# Patient Record
Sex: FEMALE | Race: BLACK OR AFRICAN AMERICAN | NOT HISPANIC OR LATINO
[De-identification: names, ages, dates, MRNs, and addresses within clinical notes are randomized per-mention and may not be internally consistent; named-entity substitution may affect disease eponyms.]

---

## 2018-04-06 ENCOUNTER — OTHER (OUTPATIENT)
Age: 63
End: 2018-04-06

## 2018-04-24 ENCOUNTER — OTHER (OUTPATIENT)
Age: 63
End: 2018-04-24

## 2019-07-26 ENCOUNTER — INPATIENT (INPATIENT)
Facility: HOSPITAL | Age: 64
LOS: 1 days | Discharge: HOME | End: 2019-07-28
Attending: HOSPITALIST | Admitting: HOSPITALIST
Payer: COMMERCIAL

## 2019-07-26 VITALS
SYSTOLIC BLOOD PRESSURE: 143 MMHG | OXYGEN SATURATION: 99 % | TEMPERATURE: 98 F | RESPIRATION RATE: 18 BRPM | DIASTOLIC BLOOD PRESSURE: 90 MMHG | HEART RATE: 99 BPM

## 2019-07-26 DIAGNOSIS — K85.90 ACUTE PANCREATITIS WITHOUT NECROSIS OR INFECTION, UNSPECIFIED: ICD-10-CM

## 2019-07-26 DIAGNOSIS — Z29.9 ENCOUNTER FOR PROPHYLACTIC MEASURES, UNSPECIFIED: ICD-10-CM

## 2019-07-26 DIAGNOSIS — J44.9 CHRONIC OBSTRUCTIVE PULMONARY DISEASE, UNSPECIFIED: ICD-10-CM

## 2019-07-26 DIAGNOSIS — I67.1 CEREBRAL ANEURYSM, NONRUPTURED: ICD-10-CM

## 2019-07-26 DIAGNOSIS — I10 ESSENTIAL (PRIMARY) HYPERTENSION: ICD-10-CM

## 2019-07-26 DIAGNOSIS — G47.30 SLEEP APNEA, UNSPECIFIED: ICD-10-CM

## 2019-07-26 DIAGNOSIS — K21.9 GASTRO-ESOPHAGEAL REFLUX DISEASE WITHOUT ESOPHAGITIS: ICD-10-CM

## 2019-07-26 LAB
ALBUMIN SERPL ELPH-MCNC: 4 G/DL — SIGNIFICANT CHANGE UP (ref 3.3–5)
ALP SERPL-CCNC: 186 U/L — HIGH (ref 40–120)
ALT FLD-CCNC: 26 U/L — SIGNIFICANT CHANGE UP (ref 4–33)
ANION GAP SERPL CALC-SCNC: 11 MMO/L — SIGNIFICANT CHANGE UP (ref 7–14)
APTT BLD: 35.1 SEC — SIGNIFICANT CHANGE UP (ref 27.5–36.3)
AST SERPL-CCNC: 23 U/L — SIGNIFICANT CHANGE UP (ref 4–32)
BASOPHILS # BLD AUTO: 0.02 K/UL — SIGNIFICANT CHANGE UP (ref 0–0.2)
BASOPHILS NFR BLD AUTO: 0.2 % — SIGNIFICANT CHANGE UP (ref 0–2)
BILIRUB SERPL-MCNC: 0.4 MG/DL — SIGNIFICANT CHANGE UP (ref 0.2–1.2)
BLD GP AB SCN SERPL QL: NEGATIVE — SIGNIFICANT CHANGE UP
BUN SERPL-MCNC: 20 MG/DL — SIGNIFICANT CHANGE UP (ref 7–23)
CALCIUM SERPL-MCNC: 9.5 MG/DL — SIGNIFICANT CHANGE UP (ref 8.4–10.5)
CHLORIDE SERPL-SCNC: 102 MMOL/L — SIGNIFICANT CHANGE UP (ref 98–107)
CO2 SERPL-SCNC: 25 MMOL/L — SIGNIFICANT CHANGE UP (ref 22–31)
CREAT SERPL-MCNC: 1.01 MG/DL — SIGNIFICANT CHANGE UP (ref 0.5–1.3)
EOSINOPHIL # BLD AUTO: 0.14 K/UL — SIGNIFICANT CHANGE UP (ref 0–0.5)
EOSINOPHIL NFR BLD AUTO: 1.4 % — SIGNIFICANT CHANGE UP (ref 0–6)
GLUCOSE SERPL-MCNC: 100 MG/DL — HIGH (ref 70–99)
HCT VFR BLD CALC: 41.1 % — SIGNIFICANT CHANGE UP (ref 34.5–45)
HGB BLD-MCNC: 13.4 G/DL — SIGNIFICANT CHANGE UP (ref 11.5–15.5)
IMM GRANULOCYTES NFR BLD AUTO: 0.2 % — SIGNIFICANT CHANGE UP (ref 0–1.5)
INR BLD: 1.2 — HIGH (ref 0.88–1.17)
LIDOCAIN IGE QN: > 600 U/L — HIGH (ref 7–60)
LYMPHOCYTES # BLD AUTO: 2.02 K/UL — SIGNIFICANT CHANGE UP (ref 1–3.3)
LYMPHOCYTES # BLD AUTO: 20.3 % — SIGNIFICANT CHANGE UP (ref 13–44)
MCHC RBC-ENTMCNC: 30.6 PG — SIGNIFICANT CHANGE UP (ref 27–34)
MCHC RBC-ENTMCNC: 32.6 % — SIGNIFICANT CHANGE UP (ref 32–36)
MCV RBC AUTO: 93.8 FL — SIGNIFICANT CHANGE UP (ref 80–100)
MONOCYTES # BLD AUTO: 0.69 K/UL — SIGNIFICANT CHANGE UP (ref 0–0.9)
MONOCYTES NFR BLD AUTO: 6.9 % — SIGNIFICANT CHANGE UP (ref 2–14)
NEUTROPHILS # BLD AUTO: 7.05 K/UL — SIGNIFICANT CHANGE UP (ref 1.8–7.4)
NEUTROPHILS NFR BLD AUTO: 71 % — SIGNIFICANT CHANGE UP (ref 43–77)
NRBC # FLD: 0 K/UL — SIGNIFICANT CHANGE UP (ref 0–0)
PLATELET # BLD AUTO: 325 K/UL — SIGNIFICANT CHANGE UP (ref 150–400)
PMV BLD: 9.5 FL — SIGNIFICANT CHANGE UP (ref 7–13)
POTASSIUM SERPL-MCNC: 4.8 MMOL/L — SIGNIFICANT CHANGE UP (ref 3.5–5.3)
POTASSIUM SERPL-SCNC: 4.8 MMOL/L — SIGNIFICANT CHANGE UP (ref 3.5–5.3)
PROT SERPL-MCNC: 8.3 G/DL — SIGNIFICANT CHANGE UP (ref 6–8.3)
PROTHROM AB SERPL-ACNC: 13.8 SEC — HIGH (ref 9.8–13.1)
RBC # BLD: 4.38 M/UL — SIGNIFICANT CHANGE UP (ref 3.8–5.2)
RBC # FLD: 14.2 % — SIGNIFICANT CHANGE UP (ref 10.3–14.5)
RH IG SCN BLD-IMP: POSITIVE — SIGNIFICANT CHANGE UP
SODIUM SERPL-SCNC: 138 MMOL/L — SIGNIFICANT CHANGE UP (ref 135–145)
TRIGL SERPL-MCNC: 126 MG/DL — SIGNIFICANT CHANGE UP (ref 10–149)
WBC # BLD: 9.94 K/UL — SIGNIFICANT CHANGE UP (ref 3.8–10.5)
WBC # FLD AUTO: 9.94 K/UL — SIGNIFICANT CHANGE UP (ref 3.8–10.5)

## 2019-07-26 PROCEDURE — 76705 ECHO EXAM OF ABDOMEN: CPT | Mod: 26

## 2019-07-26 PROCEDURE — 71045 X-RAY EXAM CHEST 1 VIEW: CPT | Mod: 26

## 2019-07-26 PROCEDURE — 99223 1ST HOSP IP/OBS HIGH 75: CPT | Mod: GC

## 2019-07-26 RX ORDER — MORPHINE SULFATE 50 MG/1
4 CAPSULE, EXTENDED RELEASE ORAL ONCE
Refills: 0 | Status: DISCONTINUED | OUTPATIENT
Start: 2019-07-26 | End: 2019-07-26

## 2019-07-26 RX ORDER — IPRATROPIUM/ALBUTEROL SULFATE 18-103MCG
3 AEROSOL WITH ADAPTER (GRAM) INHALATION EVERY 6 HOURS
Refills: 0 | Status: DISCONTINUED | OUTPATIENT
Start: 2019-07-26 | End: 2019-07-28

## 2019-07-26 RX ORDER — CLOPIDOGREL BISULFATE 75 MG/1
1 TABLET, FILM COATED ORAL
Qty: 0 | Refills: 0 | DISCHARGE

## 2019-07-26 RX ORDER — SODIUM CHLORIDE 9 MG/ML
1000 INJECTION INTRAMUSCULAR; INTRAVENOUS; SUBCUTANEOUS
Refills: 0 | Status: DISCONTINUED | OUTPATIENT
Start: 2019-07-26 | End: 2019-07-26

## 2019-07-26 RX ORDER — CLOPIDOGREL BISULFATE 75 MG/1
75 TABLET, FILM COATED ORAL DAILY
Refills: 0 | Status: DISCONTINUED | OUTPATIENT
Start: 2019-07-26 | End: 2019-07-28

## 2019-07-26 RX ORDER — SODIUM CHLORIDE 9 MG/ML
1000 INJECTION, SOLUTION INTRAVENOUS
Refills: 0 | Status: DISCONTINUED | OUTPATIENT
Start: 2019-07-26 | End: 2019-07-27

## 2019-07-26 RX ORDER — SODIUM CHLORIDE 9 MG/ML
1000 INJECTION INTRAMUSCULAR; INTRAVENOUS; SUBCUTANEOUS ONCE
Refills: 0 | Status: DISCONTINUED | OUTPATIENT
Start: 2019-07-26 | End: 2019-07-26

## 2019-07-26 RX ORDER — ONDANSETRON 8 MG/1
4 TABLET, FILM COATED ORAL EVERY 8 HOURS
Refills: 0 | Status: DISCONTINUED | OUTPATIENT
Start: 2019-07-26 | End: 2019-07-28

## 2019-07-26 RX ORDER — MORPHINE SULFATE 50 MG/1
4 CAPSULE, EXTENDED RELEASE ORAL EVERY 6 HOURS
Refills: 0 | Status: DISCONTINUED | OUTPATIENT
Start: 2019-07-26 | End: 2019-07-26

## 2019-07-26 RX ORDER — SODIUM CHLORIDE 9 MG/ML
1000 INJECTION INTRAMUSCULAR; INTRAVENOUS; SUBCUTANEOUS ONCE
Refills: 0 | Status: COMPLETED | OUTPATIENT
Start: 2019-07-26 | End: 2019-07-26

## 2019-07-26 RX ORDER — IPRATROPIUM/ALBUTEROL SULFATE 18-103MCG
3 AEROSOL WITH ADAPTER (GRAM) INHALATION ONCE
Refills: 0 | Status: COMPLETED | OUTPATIENT
Start: 2019-07-26 | End: 2019-07-26

## 2019-07-26 RX ORDER — BUDESONIDE AND FORMOTEROL FUMARATE DIHYDRATE 160; 4.5 UG/1; UG/1
2 AEROSOL RESPIRATORY (INHALATION)
Refills: 0 | Status: DISCONTINUED | OUTPATIENT
Start: 2019-07-26 | End: 2019-07-28

## 2019-07-26 RX ORDER — LINACLOTIDE 145 UG/1
1 CAPSULE, GELATIN COATED ORAL
Qty: 0 | Refills: 0 | DISCHARGE

## 2019-07-26 RX ORDER — MORPHINE SULFATE 50 MG/1
2 CAPSULE, EXTENDED RELEASE ORAL EVERY 6 HOURS
Refills: 0 | Status: DISCONTINUED | OUTPATIENT
Start: 2019-07-26 | End: 2019-07-27

## 2019-07-26 RX ORDER — MORPHINE SULFATE 50 MG/1
4 CAPSULE, EXTENDED RELEASE ORAL EVERY 6 HOURS
Refills: 0 | Status: DISCONTINUED | OUTPATIENT
Start: 2019-07-26 | End: 2019-07-27

## 2019-07-26 RX ORDER — ONDANSETRON 8 MG/1
4 TABLET, FILM COATED ORAL DAILY
Refills: 0 | Status: DISCONTINUED | OUTPATIENT
Start: 2019-07-26 | End: 2019-07-26

## 2019-07-26 RX ORDER — PANTOPRAZOLE SODIUM 20 MG/1
40 TABLET, DELAYED RELEASE ORAL
Refills: 0 | Status: DISCONTINUED | OUTPATIENT
Start: 2019-07-26 | End: 2019-07-28

## 2019-07-26 RX ORDER — AMLODIPINE BESYLATE 2.5 MG/1
2.5 TABLET ORAL EVERY 24 HOURS
Refills: 0 | Status: DISCONTINUED | OUTPATIENT
Start: 2019-07-26 | End: 2019-07-28

## 2019-07-26 RX ORDER — PANTOPRAZOLE SODIUM 20 MG/1
1 TABLET, DELAYED RELEASE ORAL
Qty: 0 | Refills: 0 | DISCHARGE

## 2019-07-26 RX ORDER — ONDANSETRON 8 MG/1
4 TABLET, FILM COATED ORAL ONCE
Refills: 0 | Status: COMPLETED | OUTPATIENT
Start: 2019-07-26 | End: 2019-07-26

## 2019-07-26 RX ADMIN — Medication 3 MILLILITER(S): at 21:17

## 2019-07-26 RX ADMIN — SODIUM CHLORIDE 150 MILLILITER(S): 9 INJECTION, SOLUTION INTRAVENOUS at 15:33

## 2019-07-26 RX ADMIN — Medication 3 MILLILITER(S): at 04:14

## 2019-07-26 RX ADMIN — AMLODIPINE BESYLATE 2.5 MILLIGRAM(S): 2.5 TABLET ORAL at 19:11

## 2019-07-26 RX ADMIN — CLOPIDOGREL BISULFATE 75 MILLIGRAM(S): 75 TABLET, FILM COATED ORAL at 19:11

## 2019-07-26 RX ADMIN — Medication 3 MILLILITER(S): at 04:26

## 2019-07-26 RX ADMIN — MORPHINE SULFATE 4 MILLIGRAM(S): 50 CAPSULE, EXTENDED RELEASE ORAL at 03:17

## 2019-07-26 RX ADMIN — MORPHINE SULFATE 4 MILLIGRAM(S): 50 CAPSULE, EXTENDED RELEASE ORAL at 19:10

## 2019-07-26 RX ADMIN — ONDANSETRON 4 MILLIGRAM(S): 8 TABLET, FILM COATED ORAL at 03:17

## 2019-07-26 RX ADMIN — BUDESONIDE AND FORMOTEROL FUMARATE DIHYDRATE 2 PUFF(S): 160; 4.5 AEROSOL RESPIRATORY (INHALATION) at 22:10

## 2019-07-26 RX ADMIN — Medication 3 MILLILITER(S): at 03:27

## 2019-07-26 RX ADMIN — SODIUM CHLORIDE 100 MILLILITER(S): 9 INJECTION INTRAMUSCULAR; INTRAVENOUS; SUBCUTANEOUS at 11:24

## 2019-07-26 RX ADMIN — MORPHINE SULFATE 4 MILLIGRAM(S): 50 CAPSULE, EXTENDED RELEASE ORAL at 10:50

## 2019-07-26 RX ADMIN — MORPHINE SULFATE 4 MILLIGRAM(S): 50 CAPSULE, EXTENDED RELEASE ORAL at 09:17

## 2019-07-26 RX ADMIN — Medication 3 MILLILITER(S): at 17:27

## 2019-07-26 RX ADMIN — MORPHINE SULFATE 4 MILLIGRAM(S): 50 CAPSULE, EXTENDED RELEASE ORAL at 17:24

## 2019-07-26 RX ADMIN — SODIUM CHLORIDE 500 MILLILITER(S): 9 INJECTION INTRAMUSCULAR; INTRAVENOUS; SUBCUTANEOUS at 03:17

## 2019-07-26 NOTE — ED ADULT NURSE NOTE - NSIMPLEMENTINTERV_GEN_ALL_ED
Implemented All Universal Safety Interventions:  [place] to call system. Call bell, personal items and telephone within reach. Instruct patient to call for assistance. Room bathroom lighting operational. Non-slip footwear when patient is off stretcher. Physically safe environment: no spills, clutter or unnecessary equipment. Stretcher in lowest position, wheels locked, appropriate side rails in place.

## 2019-07-26 NOTE — ED ADULT NURSE NOTE - OBJECTIVE STATEMENT
received pt sitting in stretcher reports epigastric pain with nausea since sunday.  pt reports pain is worse with eating, has had a few episodes of diarrhea.  IV #22 placed in left ac, unable to obtain larger access, md aware.  medicated as ordered/

## 2019-07-26 NOTE — ED PROVIDER NOTE - OBJECTIVE STATEMENT
Gurmeet STEINBERG MD PGY2: 63 F PMH CKD Gurmeet STEINBERG MD PGY2: 63 F PMH CKD and diabetes here for persistent RUQ and epigastric abdominal pain since Sunday associated with two episodes of NBNB emesis earlier during the day yesterday. No fevers, chills. Has no history of cholelithiasis. History of C section in the past. Passing flatus. No known trigger.

## 2019-07-26 NOTE — H&P ADULT - PROBLEM SELECTOR PLAN 4
- right sided s/p coil placement in 2001  - left sided without intervention   - on Plavix 75 mg at home supposedly for coil

## 2019-07-26 NOTE — H&P ADULT - ASSESSMENT
62 yo female with a PMHx of HTN, COPD/Asthma, MACK, GERD, bl brain aneurisms (s/p coiling of the right) , newly diagnosed Von Myenberg syndrome in May 2019, and IBS presenting for 4 days of epigastric pain that radiates to the RUQ found to have elevated lipase >600 and a nl RUQ scan suggestive of acute pancreatitis

## 2019-07-26 NOTE — H&P ADULT - ATTENDING COMMENTS
Patient was seen and examined personally by me. I have discussed the plan and reviewed the resident's note and agree with the above physical exam findings including assessment and plan except as indicated below.    Start clear liquids, advance diet as tolerated, IVF for now  morphine PRN for pain control  Check IGG4 Ab  Hold off Edarbyclor for BP control. Will do norvasc instead for now Patient was seen and examined personally by me. I have discussed the plan and reviewed the resident's note and agree with the above physical exam findings including assessment and plan except as indicated below.    Start clear liquids, advance diet as tolerated, IVF for now  morphine PRN for pain control  Check IGG4 Ab  Hold off Edarbyclor for BP control. Will do norvasc instead for now  Obtain CXR

## 2019-07-26 NOTE — ED PROVIDER NOTE - PHYSICAL EXAMINATION
Gurmeet STEINBERG MD PGY2:   PHYSICAL EXAM:    GENERAL: Uncomfortable  HEENT:  Atraumatic, Normocephalic  CHEST/LUNG: Chest rise equal bilaterally  HEART: Regular rate and rhythm  ABDOMEN: TTP RUQ and epigastrium.   EXTREMITIES:  2+ Peripheral Pulses.  PSYCH: A&Ox3  SKIN: No obvious rashes or lesions Gurmeet STEINBERG MD PGY2:   PHYSICAL EXAM:    GENERAL: Uncomfortable  HEENT:  Atraumatic, Normocephalic  CHEST/LUNG: Chest rise equal bilaterally  HEART: Regular rate and rhythm  ABDOMEN: TTP RUQ and epigastrium. No rebound, no guarding, no CVAT.,   EXTREMITIES:  2+ Peripheral Pulses.  PSYCH: A&Ox3  SKIN: No obvious rashes or lesions

## 2019-07-26 NOTE — H&P ADULT - NSHPREVIEWOFSYSTEMS_GEN_ALL_CORE
REVIEW OF SYSTEMS:    CONSTITUTIONAL: +weakness, no fevers, +chills  EYES/ENT: No visual changes;  No vertigo or throat pain   NECK: No pain or stiffness  RESPIRATORY: + nonproductive cough from COPD, no wheezing, hemoptysis; +sob in the ED   CARDIOVASCULAR: No chest pain or palpitations  GASTROINTESTINAL:  epigastric pain radiating to the RUQ; +nausea, vomiting, or hematemesis; No diarrhea or constipation. No melena or hematochezia.  GENITOURINARY: No dysuria, frequency or hematuria  NEUROLOGICAL: No numbness or weakness of mm   SKIN: No itching, burning, rashes, or lesions   All other review of systems is negative unless indicated above.

## 2019-07-26 NOTE — ED PROVIDER NOTE - PSH
Cerebral Aneurysm Coiling    Coil Insertion    Essential hypertension, benign    GERD (Gastroesophageal Reflux Disease)    S/P  Section  x 2  Uterine Embolization  2002

## 2019-07-26 NOTE — H&P ADULT - NSHPPHYSICALEXAM_GEN_ALL_CORE
PHYSICAL EXAM:    Vital Signs Last 24 Hrs  T(C): 36.6 (26 Jul 2019 09:10), Max: 36.7 (26 Jul 2019 00:31)  T(F): 97.9 (26 Jul 2019 09:10), Max: 98.1 (26 Jul 2019 00:31)  HR: 83 (26 Jul 2019 09:10) (83 - 109)  BP: 130/59 (26 Jul 2019 09:10) (114/68 - 143/90)  BP(mean): --  RR: 18 (26 Jul 2019 09:10) (17 - 18)  SpO2: 98% (26 Jul 2019 09:10) (97% - 99%)    General: No acute distress.  HEENT:  PERRL.  EOMI.  No scleral icterus or injection.  Moist MM.  No oropharyngeal exudates.    Neck: Supple.  Full ROM.  No JVD.  No thyromegaly. No lymphadenopathy.   Heart: RRR.  Normal S1 and S2.  No murmurs, rubs, or gallops.   Lungs: bibasilar crackles, wheezing present bl   Abdomen: BS+, soft, NT/ND, normoactive, +guarding without rigidity, diffusely TTP, organomegaly could not be assessed   Skin: Warm and dry.  No rashes.  Extremities: No edema, clubbing, or cyanosis   Musculoskeletal: No obvious deformities  Neuro: A&Ox3, moving all extremities   Psych: calm, cooperative

## 2019-07-26 NOTE — ED PROVIDER NOTE - PROGRESS NOTE DETAILS
Gurmeet STEINBERG MD PGY2: Patient's lipase elevated with normal US. Will obtain triglycerides and admit to medicine.

## 2019-07-26 NOTE — H&P ADULT - PROBLEM SELECTOR PLAN 7
DVT: mobility impaired due to pain, will start heparin 5000 Units subq  Diet: will start on clears; advance as tolerated   Dispo: pending PT eval, most likely home

## 2019-07-26 NOTE — H&P ADULT - PROBLEM SELECTOR PLAN 1
- Patient with worsening epigastric pain radiating to RUQ found to have lipase >600 suggestive of acute pancreatitis; etiology currently unknown  - ddx include hypercalcemia, hypertriglyceridemia, alcohol abuse, autoimmune pancreatitis, cholelithiasis and abdominal trauma   - denies abdominal trauma, hx of abdominal trauma, Ca and TG wnl, no hx of alcohol abuse with last drink in May, no hx of autoimmune dz in patient or family, no sign of cholelithiasis on RUQ US, no recent ERCP  - consider drug induced: no opioid hx but is on Edarbyclor for HTN which has a Sartan component, as per uptodate, sartans are class I drugs that can cause pancreatitis  - hold home antihypertensive  - switch NS to  cc/hr, evidence that patients with pancreatitis fair better on LR vs. NS   - bibasilar crackles present on exam; f/u CXR and monitor fluid status   - patient denies current nausea; d/c NPO  - start on clears; advance diet as tolerated   - continue pain control: morphine 4mg q6 PRN pain; 2mg q6 PRN breakthrough pain  - zofran 4mg IV q8 PRN nausea   - Alk phos elevated, f/u GGT - Patient with worsening epigastric pain radiating to RUQ found to have lipase >600 suggestive of acute pancreatitis; etiology currently unknown  - ddx include hypercalcemia, hypertriglyceridemia, alcohol abuse, autoimmune pancreatitis, cholelithiasis and abdominal trauma   - denies abdominal trauma, hx of abdominal trauma, Ca and TG wnl, no hx of alcohol abuse with last drink in May, no hx of autoimmune dz in patient or family, no sign of cholelithiasis on RUQ US, no recent ERCP  - consider drug induced: no opioid hx but is on Edarbyclor for HTN which has a Sartan component, as per uptodate, sartans are class I drugs that can cause pancreatitis  - hold home antihypertensive  - switch NS to  cc/hr, evidence that patients with pancreatitis fair better on LR vs. NS   - bibasilar crackles present on exam; f/u CXR and monitor fluid status   - patient denies current nausea; d/c NPO  - start on clears; advance diet as tolerated   - continue pain control: morphine 4mg q6 PRN pain; 2mg q6 PRN breakthrough pain  - zofran 4mg IV q8 PRN nausea   - Alk phos elevated, f/u GGT  - r/o autoimmune pacreatitis; f/u IGG4 Ab

## 2019-07-26 NOTE — ED PROVIDER NOTE - ATTENDING CONTRIBUTION TO CARE
HPI: 62 yo F with Past Medical History of CKD, and DM here for persistent RUQ and epigastric abdominal pain since monday associated with two episodes of NBNB emesis earlier during the day yesterday. No fevers, chills, chest pain, SOB, weakness, falls. Decreased appetitie. Has no history of cholelithiasis. History of C section in the past. Passing flatus. No known trigger.  EXAM: tenderness to palpation epigastrium and RUQ + murphys. otherwise no rebound, no guarding. No grey scherer or cullens sign.   MDM: concern for pancreatitis vs cholecystitis. Will obtain labs, imaging, provide IVF and pain meds, Make NPO in case pt needs emergency surgery. Will monitor and reassess.

## 2019-07-26 NOTE — ED PROVIDER NOTE - PMH
Aneurysm    Anxiety    Asthma    Asthma with Chronic Obstructive Pulmonary Disease (COPD)    Cerebral Aneurysm  2 small aneurysm's found 2/2011, s/p coiling in 2001  GERD (Gastroesophageal Reflux Disease)    Hypertension  since 2001  Migraines    Obesity    Obstructive Sleep Apnea    Sleep Apnea  Dx June 2010- uses CPAP machine  TIA (Transient Ischemic Attack)  2001, Feb 2011  TIA (Transient Ischemic Attack)    Uterine Leiomyoma

## 2019-07-26 NOTE — H&P ADULT - PROBLEM SELECTOR PLAN 3
- patient on Trelegy ellipta at home  - prior on Symbicort but was not affective   - slight wheezing on exam today   - start duonebs q6 PRN wheezing  - start Symbicort 160 for COPD, Trelegy equivalent not available as per pharmacy

## 2019-07-26 NOTE — H&P ADULT - PROBLEM SELECTOR PLAN 2
- on Edarbyclor 40 mg at home  - will hold since sartan component may cause pancreatitis   - will start 2.5 mg amlodipine q24   - continue to monitor BPs as patient on LR

## 2019-07-26 NOTE — ED ADULT NURSE REASSESSMENT NOTE - NS ED NURSE REASSESS COMMENT FT1
Patient asleep, appears comfortable and in no apparent distress. Awaiting for admission.  Daughter at bedside.  Vitals taken and will continue to monitor patient.

## 2019-07-26 NOTE — ED ADULT TRIAGE NOTE - CHIEF COMPLAINT QUOTE
abdominal pain since Sunday and is worse two days ago. Pt was seen by GI MD and PMD and was told to come to ER if the pain is not going away. vomiting x 2 today. No diarrhea. PMH of IBS

## 2019-07-26 NOTE — H&P ADULT - NSHPSOCIALHISTORY_GEN_ALL_CORE
30 years of smoking hx, currently active smoker, 7 cigarettes a day   Occasional alcohol use, last drink in May   Denies cocaine, heroine, opioid, marijuana use    Lives at home with  and son

## 2019-07-26 NOTE — ED PROVIDER NOTE - CLINICAL SUMMARY MEDICAL DECISION MAKING FREE TEXT BOX
Gurmeet STEINBERG MD PGY2: Patient here with RUQ TTP and difficulty tolerating PO. Will obtain RUQ US and basic labs with lipase.

## 2019-07-26 NOTE — H&P ADULT - HISTORY OF PRESENT ILLNESS
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 The pain is a sharp severe pain not associated with eating. Patient has not had this type of pain before. She has taken 2 doses of Tylenol each night for the pain but the pain has progressively worsened within the last 4 days prompting her ED visit.  Denies fevers but endorses chills x 2 days along with fatigue. Denies cp, SOB, and cough. Endorses nausea and postprandial NBNB emesis x 4 episodes, 1 prior to presentation. Endorses diarrhea within the last 4 days that is chronic due to her IBS. Endorses frequency and polyuria (no hx of DM). Denies hx of alcohol abuse and is a social drinker only (last drink in May), denies any abdominal trauma, denies hx of autoimmune disease in her or in family members, denies hx of cholelithiasis or prior pancreatitis, denies hx of opioid use, denies any recent ERCPS. Was seen by her GI doctor Dr. Mahajan and was told her sx may be due to an ulcer and endoscopy was recommended at that time. Patient also was seen by Dr. Mccabe, her PMD and was prescribed Zofran and "xanax" for her "ulcer."    ED vitals: Temp 97.9, HR 83, /89, RR 18, SaO2 98% RA. Lipase was found to be elevated to >600. A RUQ was wnl.     Patient was bolused 2L NS and started on 100 cc/hr NS for acute pancreatitis.

## 2019-07-26 NOTE — H&P ADULT - NSHPOUTPATIENTPROVIDERS_GEN_ALL_CORE
PMD: Clay Jenkinston [phone]  GI doctor: Varsha Villegas at [facility]  Liver doctor: SEDA Starr at [facility]

## 2019-07-26 NOTE — H&P ADULT - NSICDXPASTMEDICALHX_GEN_ALL_CORE_FT
PAST MEDICAL HISTORY:  Aneurysm     Asthma with Chronic Obstructive Pulmonary Disease (COPD)     Cerebral Aneurysm 2 small aneurysm's found 2/2011,  s/p coiling in 2001 of right-sided aneurism    GERD (Gastroesophageal Reflux Disease)     HTN (hypertension)     Hypertension since 2001    Obesity     Obstructive Sleep Apnea     Sleep Apnea Dx June 2010- uses CPAP machine    TIA (Transient Ischemic Attack) 2001, Feb 2011    Uterine Leiomyoma

## 2019-07-26 NOTE — H&P ADULT - NSICDXPASTSURGICALHX_GEN_ALL_CORE_FT
PAST SURGICAL HISTORY:  Cerebral Aneurysm Coiling Right sided,     S/P  Section x 2    Uterine Embolization

## 2019-07-26 NOTE — H&P ADULT - NSHPLABSRESULTS_GEN_ALL_CORE
CBC Full  -  ( 26 Jul 2019 02:45 )  WBC Count : 9.94 K/uL  Hemoglobin : 13.4 g/dL  Hematocrit : 41.1 %  Platelet Count - Automated : 325 K/uL  Mean Cell Volume : 93.8 fL  Mean Cell Hemoglobin : 30.6 pg  Mean Cell Hemoglobin Concentration : 32.6 %  Auto Neutrophil # : 7.05 K/uL  Auto Lymphocyte # : 2.02 K/uL  Auto Monocyte # : 0.69 K/uL  Auto Eosinophil # : 0.14 K/uL  Auto Basophil # : 0.02 K/uL  Auto Neutrophil % : 71.0 %  Auto Lymphocyte % : 20.3 %  Auto Monocyte % : 6.9 %  Auto Eosinophil % : 1.4 %  Auto Basophil % : 0.2 %    07-26    138  |  102  |  20  ----------------------------<  100<H>  4.8   |  25  |  1.01    Ca    9.5      26 Jul 2019 02:45    TPro  8.3  /  Alb  4.0  /  TBili  0.4  /  DBili  x   /  AST  23  /  ALT  26  /  AlkPhos  186<H>  07-26    LIVER FUNCTIONS - ( 26 Jul 2019 02:45 )  Alb: 4.0 g/dL / Pro: 8.3 g/dL / ALK PHOS: 186 u/L / ALT: 26 u/L / AST: 23 u/L / GGT: x             PT/INR - ( 26 Jul 2019 05:30 )   PT: 13.8 SEC;   INR: 1.20          PTT - ( 26 Jul 2019 05:30 )  PTT:35.1 SEC          EKG:       Imaging:      EXAM:  US ABDOMEN LIMITED        PROCEDURE DATE:  Jul 26 2019         INTERPRETATION:  Clinical indication: Right upper quadrant pain, assess   cholecystitis. Patient was premedicated.    Technique: Targeted right upper quadrant ultrasound of the abdomen was   performed.      Comparison: None.    Findings:     LIVER: Within normal limits.  BILIARY: No intrahepatic or extrahepatic biliary dilatation. Visualized   common bile duct measuring up to 0.5 cm.   GALLBLADDER: No gallstone, significant gallbladder wall thickening or   pericholecystic fluid. Negative sonographic Felix sign.    PANCREAS: Poorly visualized due to bowel gas.  RIGHT KIDNEY: 9.1 cm in length. No hydronephrosis or obvious renal stone.   ADDITIONAL: No ascites.     Impression:      No cholelithiasis or sonographic evidence of acute cholecystitis. Poorly   visualized pancreas.                  HWAYOUNG DOV M.D., ATTENDING RADIOLOGIST  This document has been electronically signed. Jul 26 2019  5:56AM

## 2019-07-27 ENCOUNTER — TRANSCRIPTION ENCOUNTER (OUTPATIENT)
Age: 64
End: 2019-07-27

## 2019-07-27 LAB
ALBUMIN SERPL ELPH-MCNC: 3.2 G/DL — LOW (ref 3.3–5)
ALP SERPL-CCNC: 145 U/L — HIGH (ref 40–120)
ALT FLD-CCNC: 17 U/L — SIGNIFICANT CHANGE UP (ref 4–33)
ANION GAP SERPL CALC-SCNC: 18 MMO/L — HIGH (ref 7–14)
AST SERPL-CCNC: 18 U/L — SIGNIFICANT CHANGE UP (ref 4–32)
BASOPHILS # BLD AUTO: 0.01 K/UL — SIGNIFICANT CHANGE UP (ref 0–0.2)
BASOPHILS NFR BLD AUTO: 0.1 % — SIGNIFICANT CHANGE UP (ref 0–2)
BILIRUB SERPL-MCNC: 0.6 MG/DL — SIGNIFICANT CHANGE UP (ref 0.2–1.2)
BUN SERPL-MCNC: 12 MG/DL — SIGNIFICANT CHANGE UP (ref 7–23)
CALCIUM SERPL-MCNC: 9.1 MG/DL — SIGNIFICANT CHANGE UP (ref 8.4–10.5)
CHLORIDE SERPL-SCNC: 102 MMOL/L — SIGNIFICANT CHANGE UP (ref 98–107)
CO2 SERPL-SCNC: 24 MMOL/L — SIGNIFICANT CHANGE UP (ref 22–31)
CREAT SERPL-MCNC: 0.84 MG/DL — SIGNIFICANT CHANGE UP (ref 0.5–1.3)
EOSINOPHIL # BLD AUTO: 0.27 K/UL — SIGNIFICANT CHANGE UP (ref 0–0.5)
EOSINOPHIL NFR BLD AUTO: 3.8 % — SIGNIFICANT CHANGE UP (ref 0–6)
GGT SERPL-CCNC: 73 U/L — HIGH (ref 5–36)
GLUCOSE SERPL-MCNC: 77 MG/DL — SIGNIFICANT CHANGE UP (ref 70–99)
HCT VFR BLD CALC: 36.5 % — SIGNIFICANT CHANGE UP (ref 34.5–45)
HCV AB S/CO SERPL IA: 0.11 S/CO — SIGNIFICANT CHANGE UP (ref 0–0.99)
HCV AB SERPL-IMP: SIGNIFICANT CHANGE UP
HGB BLD-MCNC: 11.6 G/DL — SIGNIFICANT CHANGE UP (ref 11.5–15.5)
IMM GRANULOCYTES NFR BLD AUTO: 0.3 % — SIGNIFICANT CHANGE UP (ref 0–1.5)
LYMPHOCYTES # BLD AUTO: 2.17 K/UL — SIGNIFICANT CHANGE UP (ref 1–3.3)
LYMPHOCYTES # BLD AUTO: 30.7 % — SIGNIFICANT CHANGE UP (ref 13–44)
MAGNESIUM SERPL-MCNC: 1.9 MG/DL — SIGNIFICANT CHANGE UP (ref 1.6–2.6)
MCHC RBC-ENTMCNC: 30.1 PG — SIGNIFICANT CHANGE UP (ref 27–34)
MCHC RBC-ENTMCNC: 31.8 % — LOW (ref 32–36)
MCV RBC AUTO: 94.6 FL — SIGNIFICANT CHANGE UP (ref 80–100)
MONOCYTES # BLD AUTO: 0.67 K/UL — SIGNIFICANT CHANGE UP (ref 0–0.9)
MONOCYTES NFR BLD AUTO: 9.5 % — SIGNIFICANT CHANGE UP (ref 2–14)
NEUTROPHILS # BLD AUTO: 3.92 K/UL — SIGNIFICANT CHANGE UP (ref 1.8–7.4)
NEUTROPHILS NFR BLD AUTO: 55.6 % — SIGNIFICANT CHANGE UP (ref 43–77)
NRBC # FLD: 0 K/UL — SIGNIFICANT CHANGE UP (ref 0–0)
PHOSPHATE SERPL-MCNC: 2.7 MG/DL — SIGNIFICANT CHANGE UP (ref 2.5–4.5)
PLATELET # BLD AUTO: 284 K/UL — SIGNIFICANT CHANGE UP (ref 150–400)
PMV BLD: 9.4 FL — SIGNIFICANT CHANGE UP (ref 7–13)
POTASSIUM SERPL-MCNC: 3.7 MMOL/L — SIGNIFICANT CHANGE UP (ref 3.5–5.3)
POTASSIUM SERPL-SCNC: 3.7 MMOL/L — SIGNIFICANT CHANGE UP (ref 3.5–5.3)
PROT SERPL-MCNC: 7.1 G/DL — SIGNIFICANT CHANGE UP (ref 6–8.3)
RBC # BLD: 3.86 M/UL — SIGNIFICANT CHANGE UP (ref 3.8–5.2)
RBC # FLD: 14.6 % — HIGH (ref 10.3–14.5)
SODIUM SERPL-SCNC: 144 MMOL/L — SIGNIFICANT CHANGE UP (ref 135–145)
WBC # BLD: 7.06 K/UL — SIGNIFICANT CHANGE UP (ref 3.8–10.5)
WBC # FLD AUTO: 7.06 K/UL — SIGNIFICANT CHANGE UP (ref 3.8–10.5)

## 2019-07-27 PROCEDURE — 99233 SBSQ HOSP IP/OBS HIGH 50: CPT | Mod: GC

## 2019-07-27 RX ORDER — DIPHENHYDRAMINE HCL 50 MG
25 CAPSULE ORAL ONCE
Refills: 0 | Status: COMPLETED | OUTPATIENT
Start: 2019-07-27 | End: 2019-07-27

## 2019-07-27 RX ORDER — POLYETHYLENE GLYCOL 3350 17 G/17G
17 POWDER, FOR SOLUTION ORAL ONCE
Refills: 0 | Status: COMPLETED | OUTPATIENT
Start: 2019-07-28 | End: 2019-07-28

## 2019-07-27 RX ORDER — HYDROCORTISONE 1 %
1 OINTMENT (GRAM) TOPICAL
Refills: 0 | Status: DISCONTINUED | OUTPATIENT
Start: 2019-07-27 | End: 2019-07-28

## 2019-07-27 RX ORDER — POLYETHYLENE GLYCOL 3350 17 G/17G
17 POWDER, FOR SOLUTION ORAL ONCE
Refills: 0 | Status: COMPLETED | OUTPATIENT
Start: 2019-07-27 | End: 2019-07-27

## 2019-07-27 RX ORDER — OXYCODONE HYDROCHLORIDE 5 MG/1
1 TABLET ORAL
Qty: 12 | Refills: 0
Start: 2019-07-27 | End: 2019-07-29

## 2019-07-27 RX ORDER — DIPHENHYDRAMINE HCL 50 MG
25 CAPSULE ORAL EVERY 6 HOURS
Refills: 0 | Status: DISCONTINUED | OUTPATIENT
Start: 2019-07-27 | End: 2019-07-28

## 2019-07-27 RX ORDER — CALAMINE AND ZINC OXIDE AND PHENOL 160; 10 MG/ML; MG/ML
1 LOTION TOPICAL THREE TIMES A DAY
Refills: 0 | Status: DISCONTINUED | OUTPATIENT
Start: 2019-07-27 | End: 2019-07-28

## 2019-07-27 RX ORDER — MORPHINE SULFATE 50 MG/1
5 CAPSULE, EXTENDED RELEASE ORAL EVERY 8 HOURS
Refills: 0 | Status: DISCONTINUED | OUTPATIENT
Start: 2019-07-27 | End: 2019-07-27

## 2019-07-27 RX ORDER — MORPHINE SULFATE 50 MG/1
15 CAPSULE, EXTENDED RELEASE ORAL EVERY 4 HOURS
Refills: 0 | Status: DISCONTINUED | OUTPATIENT
Start: 2019-07-27 | End: 2019-07-27

## 2019-07-27 RX ORDER — ONDANSETRON 8 MG/1
4 TABLET, FILM COATED ORAL
Qty: 36 | Refills: 0
Start: 2019-07-27 | End: 2019-07-29

## 2019-07-27 RX ORDER — MORPHINE SULFATE 50 MG/1
7.5 CAPSULE, EXTENDED RELEASE ORAL
Refills: 0 | Status: DISCONTINUED | OUTPATIENT
Start: 2019-07-27 | End: 2019-07-28

## 2019-07-27 RX ADMIN — Medication 3 MILLILITER(S): at 03:26

## 2019-07-27 RX ADMIN — BUDESONIDE AND FORMOTEROL FUMARATE DIHYDRATE 2 PUFF(S): 160; 4.5 AEROSOL RESPIRATORY (INHALATION) at 11:23

## 2019-07-27 RX ADMIN — Medication 3 MILLILITER(S): at 21:43

## 2019-07-27 RX ADMIN — Medication 1 APPLICATION(S): at 22:02

## 2019-07-27 RX ADMIN — BUDESONIDE AND FORMOTEROL FUMARATE DIHYDRATE 2 PUFF(S): 160; 4.5 AEROSOL RESPIRATORY (INHALATION) at 21:57

## 2019-07-27 RX ADMIN — MORPHINE SULFATE 15 MILLIGRAM(S): 50 CAPSULE, EXTENDED RELEASE ORAL at 11:52

## 2019-07-27 RX ADMIN — MORPHINE SULFATE 7.5 MILLIGRAM(S): 50 CAPSULE, EXTENDED RELEASE ORAL at 16:37

## 2019-07-27 RX ADMIN — CLOPIDOGREL BISULFATE 75 MILLIGRAM(S): 75 TABLET, FILM COATED ORAL at 11:22

## 2019-07-27 RX ADMIN — MORPHINE SULFATE 4 MILLIGRAM(S): 50 CAPSULE, EXTENDED RELEASE ORAL at 01:18

## 2019-07-27 RX ADMIN — MORPHINE SULFATE 7.5 MILLIGRAM(S): 50 CAPSULE, EXTENDED RELEASE ORAL at 21:00

## 2019-07-27 RX ADMIN — MORPHINE SULFATE 7.5 MILLIGRAM(S): 50 CAPSULE, EXTENDED RELEASE ORAL at 20:30

## 2019-07-27 RX ADMIN — POLYETHYLENE GLYCOL 3350 17 GRAM(S): 17 POWDER, FOR SOLUTION ORAL at 11:22

## 2019-07-27 RX ADMIN — AMLODIPINE BESYLATE 2.5 MILLIGRAM(S): 2.5 TABLET ORAL at 20:31

## 2019-07-27 RX ADMIN — MORPHINE SULFATE 15 MILLIGRAM(S): 50 CAPSULE, EXTENDED RELEASE ORAL at 11:22

## 2019-07-27 RX ADMIN — MORPHINE SULFATE 4 MILLIGRAM(S): 50 CAPSULE, EXTENDED RELEASE ORAL at 01:00

## 2019-07-27 RX ADMIN — MORPHINE SULFATE 7.5 MILLIGRAM(S): 50 CAPSULE, EXTENDED RELEASE ORAL at 17:07

## 2019-07-27 RX ADMIN — PANTOPRAZOLE SODIUM 40 MILLIGRAM(S): 20 TABLET, DELAYED RELEASE ORAL at 06:07

## 2019-07-27 RX ADMIN — Medication 3 MILLILITER(S): at 09:17

## 2019-07-27 RX ADMIN — Medication 25 MILLIGRAM(S): at 21:57

## 2019-07-27 RX ADMIN — Medication 25 MILLIGRAM(S): at 16:10

## 2019-07-27 NOTE — PROGRESS NOTE ADULT - SUBJECTIVE AND OBJECTIVE BOX
***************************************************************  Dr. Rocío Teixeira  Internal Medicine   [facility] Pager:   [facility] Pager: [phone]   ***************************************************************    ADRIANA CALIXTO  63y  MRN: [id number]    Subjective:    Patient is a 63y old  Female who presents with a chief complaint of CC severe abdominal pain with nausea and vomiting (26 Jul 2019 14:53)      Interval history/overnight events:    IV infiltrated last night. Tolerating clears. Declined pain medication this morning. Some RUQ and epigastric pain with PO intake though improving from before.       MEDICATIONS  (STANDING):  ALBUTerol/ipratropium for Nebulization 3 milliLiter(s) Nebulizer every 6 hours  amLODIPine   Tablet 2.5 milliGRAM(s) Oral every 24 hours  buDESOnide 160 MICROgram(s)/formoterol 4.5 MICROgram(s) Inhaler 2 Puff(s) Inhalation two times a day  clopidogrel Tablet 75 milliGRAM(s) Oral daily  lactated ringers. 1000 milliLiter(s) (150 mL/Hr) IV Continuous <Continuous>  pantoprazole    Tablet 40 milliGRAM(s) Oral before breakfast    MEDICATIONS  (PRN):  morphine  - Injectable 4 milliGRAM(s) IV Push every 6 hours PRN Moderate - Severe Pain  morphine  - Injectable 2 milliGRAM(s) IV Push every 6 hours PRN breakthrough pain  ondansetron Injectable 4 milliGRAM(s) IV Push every 8 hours PRN Nausea and/or Vomiting        Objective:    Vitals: Vital Signs Last 24 Hrs  T(C): 36.6 (07-27-19 @ 05:24), Max: 37 (07-26-19 @ 19:05)  T(F): 97.8 (07-27-19 @ 05:24), Max: 98.6 (07-26-19 @ 19:05)  HR: 90 (07-27-19 @ 09:18) (80 - 100)  BP: 114/46 (07-27-19 @ 05:24) (105/60 - 128/58)  BP(mean): --  RR: 18 (07-27-19 @ 05:24) (17 - 18)  SpO2: 99% (07-27-19 @ 09:18) (90% - 100%)            I&O's Summary      PHYSICAL EXAM:  GENERAL: NAD  HEENT: PERRL, no scleral icterus, no head and neck lad   CHEST/LUNG: CTAB, no wheezing, crackles, or ronchi   HEART: RRR, normal S1, S2, no murmurs, gallops, or rubs appreciated   ABDOMEN: soft, nondistended, non-tender, normoactive, no HSM, no rebound, no guarding, no rigidity  SKIN: No rashes or lesions  NERVOUS SYSTEM: Alert & Oriented X3  PSYCH: calm and cooperative     LABS:    07-27    144  |  102  |  12  ----------------------------<  77  3.7   |  24  |  0.84  07-26    138  |  102  |  20  ----------------------------<  100<H>  4.8   |  25  |  1.01    Ca    9.1      27 Jul 2019 07:05  Ca    9.5      26 Jul 2019 02:45  Phos  2.7     07-27  Mg     1.9     07-27    TPro  7.1  /  Alb  3.2<L>  /  TBili  0.6  /  DBili  x   /  AST  18  /  ALT  17  /  AlkPhos  145<H>  07-27  TPro  8.3  /  Alb  4.0  /  TBili  0.4  /  DBili  x   /  AST  23  /  ALT  26  /  AlkPhos  186<H>  07-26    PT/INR - ( 26 Jul 2019 05:30 )   PT: 13.8 SEC;   INR: 1.20          PTT - ( 26 Jul 2019 05:30 )  PTT:35.1 SEC                                        11.6   7.06  )-----------( 284      ( 27 Jul 2019 07:05 )             36.5                         13.4   9.94  )-----------( 325      ( 26 Jul 2019 02:45 )             41.1     CAPILLARY BLOOD GLUCOSE                      RADIOLOGY & ADDITIONAL TESTS:            Imaging Personally Reviewed:  [ ] YES  [ ] NO    Consultants involved in case:   Consultant(s) Notes Reviewed:  [ ] YES  [ ] NO:   Care Discussed with Consultants/Other Providers [ ] YES  [ ] NO ***************************************************************  Dr. Rocío Teixeira  Internal Medicine   [facility] Pager:   [facility] Pager: [phone]   ***************************************************************    ADRIANA CALIXTO  63y  MRN: [id number]    Subjective:    Patient is a 63y old  Female who presents with a chief complaint of CC severe abdominal pain with nausea and vomiting (26 Jul 2019 14:53)      Interval history/overnight events:    IV infiltrated last night. Tolerating clears. Declined pain medication this morning. Some RUQ and epigastric pain with PO intake though improving from before. Will try to advance to regular diet.      MEDICATIONS  (STANDING):  ALBUTerol/ipratropium for Nebulization 3 milliLiter(s) Nebulizer every 6 hours  amLODIPine   Tablet 2.5 milliGRAM(s) Oral every 24 hours  buDESOnide 160 MICROgram(s)/formoterol 4.5 MICROgram(s) Inhaler 2 Puff(s) Inhalation two times a day  clopidogrel Tablet 75 milliGRAM(s) Oral daily  lactated ringers. 1000 milliLiter(s) (150 mL/Hr) IV Continuous <Continuous>  pantoprazole    Tablet 40 milliGRAM(s) Oral before breakfast    MEDICATIONS  (PRN):  morphine  - Injectable 4 milliGRAM(s) IV Push every 6 hours PRN Moderate - Severe Pain  morphine  - Injectable 2 milliGRAM(s) IV Push every 6 hours PRN breakthrough pain  ondansetron Injectable 4 milliGRAM(s) IV Push every 8 hours PRN Nausea and/or Vomiting        Objective:    Vitals: Vital Signs Last 24 Hrs  T(C): 36.6 (07-27-19 @ 05:24), Max: 37 (07-26-19 @ 19:05)  T(F): 97.8 (07-27-19 @ 05:24), Max: 98.6 (07-26-19 @ 19:05)  HR: 90 (07-27-19 @ 09:18) (80 - 100)  BP: 114/46 (07-27-19 @ 05:24) (105/60 - 128/58)  BP(mean): --  RR: 18 (07-27-19 @ 05:24) (17 - 18)  SpO2: 99% (07-27-19 @ 09:18) (90% - 100%)            I&O's Summary    Physical Exam:     General: No acute distress.  	HEENT:  PERRL.  EOMI.  No scleral icterus or injection.  Moist MM.  No oropharyngeal exudates.    	Neck: Supple.  Full ROM.  No JVD.  No thyromegaly. No lymphadenopathy.   	Heart: RRR.  Normal S1 and S2.  No murmurs, rubs, or gallops.   	Lungs: no crackles appreciated today, wheezing present bl   	Abdomen: BS+, soft, NT/ND, normoactive, TTP epigastrically and RUQ, no rebound or rigidity   	Skin: Warm and dry.  No rashes.  	Extremities: No edema, clubbing, or cyanosis   	Musculoskeletal: No obvious deformities  	Neuro: A&Ox3, moving all extremities   Psych: calm, cooperative        LABS:    07-27    144  |  102  |  12  ----------------------------<  77  3.7   |  24  |  0.84  07-26    138  |  102  |  20  ----------------------------<  100<H>  4.8   |  25  |  1.01    Ca    9.1      27 Jul 2019 07:05  Ca    9.5      26 Jul 2019 02:45  Phos  2.7     07-27  Mg     1.9     07-27    TPro  7.1  /  Alb  3.2<L>  /  TBili  0.6  /  DBili  x   /  AST  18  /  ALT  17  /  AlkPhos  145<H>  07-27  TPro  8.3  /  Alb  4.0  /  TBili  0.4  /  DBili  x   /  AST  23  /  ALT  26  /  AlkPhos  186<H>  07-26    PT/INR - ( 26 Jul 2019 05:30 )   PT: 13.8 SEC;   INR: 1.20          PTT - ( 26 Jul 2019 05:30 )  PTT:35.1 SEC                                        11.6   7.06  )-----------( 284      ( 27 Jul 2019 07:05 )             36.5                         13.4   9.94  )-----------( 325      ( 26 Jul 2019 02:45 )             41.1     CAPILLARY BLOOD GLUCOSE                      RADIOLOGY & ADDITIONAL TESTS:            Imaging Personally Reviewed:  [ ] YES  [ ] NO    Consultants involved in case:   Consultant(s) Notes Reviewed:  [ ] YES  [ ] NO:   Care Discussed with Consultants/Other Providers [ ] YES  [ ] NO

## 2019-07-27 NOTE — PROGRESS NOTE ADULT - PROBLEM SELECTOR PLAN 5
- last EGD 2016   - start home pantoprazole 40 mg - last EGD 2016   - continue home pantoprazole 40 mg

## 2019-07-27 NOTE — DISCHARGE NOTE PROVIDER - NSDCCPCAREPLAN_GEN_ALL_CORE_FT
PRINCIPAL DISCHARGE DIAGNOSIS  Diagnosis: Acute pancreatitis, unspecified complication status, unspecified pancreatitis type  Assessment and Plan of Treatment: You presented to the hospital with 4 days of upper abdominal pain that radiates to the right side of your abdomen. You were found to have acute pancreatitis from an unknown cause. You were treated with IV fluids for one day. Please continue hydrating yourself for the next week.  Please continue to take Zofran for nausea following disharge. Please continue to take oxycodone 5mg every 6 hours as needed for pain. Please follow up with your primary medical doctor 1 week from    Please return to the hospital if you have worsening abdominal pain, have worsening nausea or vomiting, are unable to eat, or have fevers and chills. PRINCIPAL DISCHARGE DIAGNOSIS  Diagnosis: Acute pancreatitis, unspecified complication status, unspecified pancreatitis type  Assessment and Plan of Treatment: You presented to the hospital with 4 days of upper abdominal pain that radiates to the right side of your abdomen. You were found to have acute pancreatitis from an unknown cause. You were treated with IV fluids for one day. Please continue hydrating yourself for the next week.  Please continue to take Zofran for nausea following disharge. Please continue to take Percocet every 6 hours as needed for pain. Please follow up with your primary medical doctor or gastroenterologist within 1 week from    Please return to the hospital if you have worsening abdominal pain, have worsening nausea or vomiting, are unable to eat, or have fevers and chills. PRINCIPAL DISCHARGE DIAGNOSIS  Diagnosis: Acute pancreatitis, unspecified complication status, unspecified pancreatitis type  Assessment and Plan of Treatment: You presented to the hospital with 4 days of upper abdominal pain that radiates to the right side of your abdomen. You were found to have acute pancreatitis from an unknown cause. You were treated with IV fluids for one day. Please continue hydrating yourself for the next week.  Please continue to take Zofran for nausea following disharge. Please continue to take Percocet every 6 hours as needed for pain. Please follow up with your primary medical doctor or gastroenterologist within 1 week from    Please return to the hospital if you have worsening abdominal pain, have worsening nausea or vomiting, are unable to eat, or have fevers and chills.      SECONDARY DISCHARGE DIAGNOSES  Diagnosis: Hypertension  Assessment and Plan of Treatment: please stop taking the edarbyclor as some components of this can contribute to pancreatitis  For BP you can take norvasc 2.5mg once daily

## 2019-07-27 NOTE — PROVIDER CONTACT NOTE (OTHER) - SITUATION
Patient's bp 114/46  and she denies symptoms. He ultrasound guided iv infiltrated.
Pt was sleeping, woke up c/o severe itching through out body. Last given morphine oral tablet at 1122
diastolic 46

## 2019-07-27 NOTE — PROGRESS NOTE ADULT - PROBLEM SELECTOR PLAN 2
- on Edarbyclor 40 mg at home  - will hold since sartan component may cause pancreatitis   - will start 2.5 mg amlodipine q24   - IV hydration on hold due to infiltrated IV   - tolerating PO intake, encourage hydration - on Edarbyclor 40 mg at home  - will hold since sartan component may cause pancreatitis   - continue 2.5 mg amlodipine q24   - IV hydration on hold due to infiltrated IV   - tolerating PO intake, encourage hydration

## 2019-07-27 NOTE — DISCHARGE NOTE PROVIDER - NSDCFUADDAPPT_GEN_ALL_CORE_FT
Please follow up with your PMD within 1 week of discharge. Please follow up with your PMD:Dr. Estelle duw 7/29

## 2019-07-27 NOTE — PROVIDER CONTACT NOTE (OTHER) - ACTION/TREATMENT ORDERED:
will continue to monitor
Benedryl for possible reaction  assess at bedside
Md was informed. Ok w/o iv/fluids for now.

## 2019-07-27 NOTE — DISCHARGE NOTE PROVIDER - HOSPITAL COURSE
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 The pain is a sharp severe pain not associated with eating. Patient has not had this type of pain before. She has taken 2 doses of Tylenol each night for the pain but the pain has progressively worsened within the last 4 days prompting her ED visit.  Denies fevers but endorses chills x 2 days along with fatigue. Denies cp, SOB, and cough. Endorses nausea and postprandial NBNB emesis x 4 episodes, 1 prior to presentation. Endorses diarrhea within the last 4 days that is chronic due to her IBS. Endorses frequency and polyuria (no hx of DM). Denies hx of alcohol abuse and is a social drinker only (last drink in May), denies any abdominal trauma, denies hx of autoimmune disease in her or in family members, denies hx of cholelithiasis or prior pancreatitis, denies hx of opioid use, denies any recent ERCP. Was seen by her GI doctor Dr. Mahajan and was told her sx may be due to an ulcer and endoscopy was recommended at that time. Patient also was seen by Dr. Mccabe, her PMD and was prescribed Zofran and "xanax" for her "ulcer." ED vitals: Temp 97.9, HR 83, /89, RR 18, SaO2 98% RA. Lipase was found to be elevated to >600. A RUQ was wnl. Patient was bolused 2L NS and started on 100 cc/hr NS for treatment of acute pancreatitis for 24hrs. Pain was managed with morphine and nausea with Zofran. On day two of hospitalization, patient was tolerating clears but unable to tolerate solids due to epigastric pain. Pain management was optimized and patient was transitioned to solids. Patient will be discharged on 5mg oxycodone q6 and zofran 4mg q8 x 3 days. 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 The pain is a sharp severe pain not associated with eating. Patient has not had this type of pain before. She has taken 2 doses of Tylenol each night for the pain but the pain has progressively worsened within the last 4 days prompting her ED visit.  Denies fevers but endorses chills x 2 days along with fatigue. Denies cp, SOB, and cough. Endorses nausea and postprandial NBNB emesis x 4 episodes, 1 prior to presentation. Endorses diarrhea within the last 4 days that is chronic due to her IBS. Endorses frequency and polyuria (no hx of DM). Denies hx of alcohol abuse and is a social drinker only (last drink in May), denies any abdominal trauma, denies hx of autoimmune disease in her or in family members, denies hx of cholelithiasis or prior pancreatitis, denies hx of opioid use, denies any recent ERCP. Was seen by her GI doctor Dr. Mahajan and was told her sx may be due to an ulcer and endoscopy was recommended at that time. Patient also was seen by Dr. Mccabe, her PMD and was prescribed Zofran and "xanax" for her "ulcer." ED vitals: Temp 97.9, HR 83, /89, RR 18, SaO2 98% RA. Lipase was found to be elevated to >600. A RUQ was wnl. Patient was bolused 2L NS and started on 100 cc/hr NS for treatment of acute pancreatitis for 24hrs. Pain was managed with morphine and nausea with Zofran. On day two of hospitalization, patient was tolerating clears but unable to tolerate solids due to epigastric pain. Pain management was optimized and patient was transitioned to solids. Patient will be discharged on Percocet 5/325 q6 and zofran 4mg q8 x 3 days.

## 2019-07-27 NOTE — DISCHARGE NOTE PROVIDER - CARE PROVIDER_API CALL
Juanito Mccabe  [address], [place] [address]  Phone: [phone]  Fax: (   )    -  Follow Up Time: 1 week Juanito Mccabe  [address], [place] [address]  Phone: [phone]  Fax: (   )    -  Follow Up Time: 1 week

## 2019-07-27 NOTE — PROGRESS NOTE ADULT - PROBLEM SELECTOR PLAN 4
- right sided s/p coil placement in 2001  - left sided without intervention   - on Plavix 75 mg at home supposedly for coil - right sided s/p coil placement in 2001  - left sided without intervention   - on Plavix 75 mg at home supposedly for coil  - continue Plavix

## 2019-07-27 NOTE — DISCHARGE NOTE PROVIDER - PROVIDER TOKENS
FREE:[LAST:[Estelle],FIRST:[Juanito],PHONE:[phone]],FAX:[(   [phone]    -],ADDRESS:[[address] [place] [address] [id number], [place] [address]],FOLLOWUP:[1 week]]

## 2019-07-27 NOTE — PROGRESS NOTE ADULT - PROBLEM SELECTOR PLAN 1
- Patient with worsening epigastric pain radiating to RUQ found to have lipase >600 suggestive of acute pancreatitis; etiology currently unknown  - ddx include hypercalcemia, hypertriglyceridemia, alcohol abuse, autoimmune pancreatitis, cholelithiasis and abdominal trauma   - denies abdominal trauma, hx of abdominal trauma, Ca and TG wnl, no hx of alcohol abuse with last drink in May, no hx of autoimmune dz in patient or family, no sign of cholelithiasis on RUQ US, no recent ERCP  - consider drug induced: no opioid hx but is on Edarbyclor for HTN which has a Sartan component, as per uptodate, sartans are class I drugs that can cause pancreatitis  - hold home antihypertensive  - tolerating PO, advance diet as tolerated   - morphine 4mg q6 PRN pain; 2mg q6 PRN breakthrough pain; switch to PO  - zofran 4mg IV q8 PRN nausea   - Alk phos elevated, f/u GGT  - r/o autoimmune pacreatitis; f/u IGG4 Ab - Patient with worsening epigastric pain radiating to RUQ found to have lipase >600 suggestive of acute pancreatitis; etiology currently unknown  - ddx include hypercalcemia, hypertriglyceridemia, alcohol abuse, autoimmune pancreatitis, cholelithiasis and abdominal trauma   - denies abdominal trauma, hx of abdominal trauma, Ca and TG wnl, no hx of alcohol abuse with last drink in May, no hx of autoimmune dz in patient or family, no sign of cholelithiasis on RUQ US, no recent ERCP  - consider drug induced: no opioid hx but is on Edarbyclor for HTN which has a Sartan component, as per [employer], sartans are class I drugs that can cause pancreatitis  - hold home antihypertensive  - patient tolerating clears but not solids; will transition back to clears   - switch to PO morphine 15mg q4 for pain   - zofran 4mg IV q8 PRN nausea   - Alk phos elevated, GGT elevated to 74; consider contribution of Von Myenberg syndrome   - r/o autoimmune pancreatitis; f/u IGG4 Ab

## 2019-07-27 NOTE — DISCHARGE NOTE PROVIDER - NSDCCPTREATMENT_GEN_ALL_CORE_FT
PRINCIPAL PROCEDURE  Procedure: US RUQ abdomen  Findings and Treatment: Enter shared details here, e.g., 'No  EXAM:  US ABDOMEN LIMITED    PROCEDURE DATE:  Jul 26 2019   INTERPRETATION:  Clini  EXAM:  US ABDOMEN LIMITED    PROCEDURE DATE:  Jul 26 2019   INTERPRETATION:  Clinical indication: Right upper quadrant pain, assess   cholecystitis. Patient was premedicated.  Technique: Targeted right upper quadrant ultrasound of the abdomen was   performed.    Comparison: None.  Findings:   LIVER: Within normal limits.  BILIARY: No intrahepatic or extrahepatic biliary dilatation. Visualized   common bile duct measuring up to 0.5 cm.   GALLBLADDER: No gallstone, significant gallbladder wall thickening or   pericholecystic fluid. Negative sonographic Felix sign.    PANCREAS: Poorly visualized due to bowel gas.  RIGHT KIDNEY: 9.1 cm in length. No hydronephrosis or obvious renal stone.   ADDITIONAL: No ascites.   Impression:    No cholelithiasis or sonographic evidence of acute cholecystitis. Poorly   visualized pancreas.  tracy indication: Right upper quadrant pain, assess   cholecystitis. Patient was premedicated.  Technique: Targeted right upper quadrant ultrasound of the abdomen was   performed.    Comparison: None.  Findings:   LIVER: Within normal limits.  BILIARY: No intrahepatic or extrahepatic biliary dilatation. Visualized   common bile duct measuring up to 0.5 cm.   GALLBLADDER: No gallstone, significant gallbladder wall thickening or   pericholecystic fluid. Negative sonographic Felix sign.    PANCREAS: Poorly visualized due to bowel gas.  RIGHT KIDNEY: 9.1 cm in length. No hydronephrosis or obvious renal stone.   ADDITIONAL: No ascites.   Impression:    No cholelithiasis or sonographic evidence of acute cholecystitis. Poorly   visualized pancreas.  alexis on CXR 1/1/16.'

## 2019-07-27 NOTE — PROGRESS NOTE ADULT - PROBLEM SELECTOR PLAN 3
- patient on Trelegy ellipta at home  - prior on Symbicort but was not affective   - slight wheezing on exam today   - start duonebs q6 PRN wheezing  - start Symbicort 160 for COPD, Trelegy equivalent not available as per pharmacy - patient on Trelegy ellipta at home  - prior on Symbicort but was not affective   - no wheezing on exam today  - continue duonebs q6 PRN wheezing  - continue Symbicort 160 for COPD, Trelegy equivalent not available as per pharmacy

## 2019-07-28 ENCOUNTER — TRANSCRIPTION ENCOUNTER (OUTPATIENT)
Age: 64
End: 2019-07-28

## 2019-07-28 VITALS — OXYGEN SATURATION: 98 %

## 2019-07-28 LAB
ANION GAP SERPL CALC-SCNC: 11 MMO/L — SIGNIFICANT CHANGE UP (ref 7–14)
BUN SERPL-MCNC: 11 MG/DL — SIGNIFICANT CHANGE UP (ref 7–23)
CALCIUM SERPL-MCNC: 9.2 MG/DL — SIGNIFICANT CHANGE UP (ref 8.4–10.5)
CHLORIDE SERPL-SCNC: 100 MMOL/L — SIGNIFICANT CHANGE UP (ref 98–107)
CO2 SERPL-SCNC: 28 MMOL/L — SIGNIFICANT CHANGE UP (ref 22–31)
CREAT SERPL-MCNC: 1.02 MG/DL — SIGNIFICANT CHANGE UP (ref 0.5–1.3)
GLUCOSE SERPL-MCNC: 86 MG/DL — SIGNIFICANT CHANGE UP (ref 70–99)
HCT VFR BLD CALC: 33.9 % — LOW (ref 34.5–45)
HGB BLD-MCNC: 10.8 G/DL — LOW (ref 11.5–15.5)
MAGNESIUM SERPL-MCNC: 1.9 MG/DL — SIGNIFICANT CHANGE UP (ref 1.6–2.6)
MCHC RBC-ENTMCNC: 29.7 PG — SIGNIFICANT CHANGE UP (ref 27–34)
MCHC RBC-ENTMCNC: 31.9 % — LOW (ref 32–36)
MCV RBC AUTO: 93.1 FL — SIGNIFICANT CHANGE UP (ref 80–100)
NRBC # FLD: 0 K/UL — SIGNIFICANT CHANGE UP (ref 0–0)
PHOSPHATE SERPL-MCNC: 3.1 MG/DL — SIGNIFICANT CHANGE UP (ref 2.5–4.5)
PLATELET # BLD AUTO: 291 K/UL — SIGNIFICANT CHANGE UP (ref 150–400)
PMV BLD: 9 FL — SIGNIFICANT CHANGE UP (ref 7–13)
POTASSIUM SERPL-MCNC: 3.8 MMOL/L — SIGNIFICANT CHANGE UP (ref 3.5–5.3)
POTASSIUM SERPL-SCNC: 3.8 MMOL/L — SIGNIFICANT CHANGE UP (ref 3.5–5.3)
RBC # BLD: 3.64 M/UL — LOW (ref 3.8–5.2)
RBC # FLD: 14.4 % — SIGNIFICANT CHANGE UP (ref 10.3–14.5)
SODIUM SERPL-SCNC: 139 MMOL/L — SIGNIFICANT CHANGE UP (ref 135–145)
WBC # BLD: 5.93 K/UL — SIGNIFICANT CHANGE UP (ref 3.8–10.5)
WBC # FLD AUTO: 5.93 K/UL — SIGNIFICANT CHANGE UP (ref 3.8–10.5)

## 2019-07-28 PROCEDURE — 99239 HOSP IP/OBS DSCHRG MGMT >30: CPT | Mod: GC

## 2019-07-28 RX ORDER — ONDANSETRON 8 MG/1
4 TABLET, FILM COATED ORAL
Qty: 9 | Refills: 0
Start: 2019-07-28 | End: 2019-07-30

## 2019-07-28 RX ORDER — AMLODIPINE BESYLATE 2.5 MG/1
1 TABLET ORAL
Qty: 30 | Refills: 0
Start: 2019-07-28 | End: 2019-08-26

## 2019-07-28 RX ORDER — ONDANSETRON 8 MG/1
4 TABLET, FILM COATED ORAL ONCE
Refills: 0 | Status: COMPLETED | OUTPATIENT
Start: 2019-07-28 | End: 2019-07-28

## 2019-07-28 RX ADMIN — MORPHINE SULFATE 7.5 MILLIGRAM(S): 50 CAPSULE, EXTENDED RELEASE ORAL at 01:47

## 2019-07-28 RX ADMIN — PANTOPRAZOLE SODIUM 40 MILLIGRAM(S): 20 TABLET, DELAYED RELEASE ORAL at 06:15

## 2019-07-28 RX ADMIN — CLOPIDOGREL BISULFATE 75 MILLIGRAM(S): 75 TABLET, FILM COATED ORAL at 11:33

## 2019-07-28 RX ADMIN — BUDESONIDE AND FORMOTEROL FUMARATE DIHYDRATE 2 PUFF(S): 160; 4.5 AEROSOL RESPIRATORY (INHALATION) at 09:49

## 2019-07-28 RX ADMIN — ONDANSETRON 4 MILLIGRAM(S): 8 TABLET, FILM COATED ORAL at 13:29

## 2019-07-28 RX ADMIN — MORPHINE SULFATE 7.5 MILLIGRAM(S): 50 CAPSULE, EXTENDED RELEASE ORAL at 02:17

## 2019-07-28 RX ADMIN — Medication 3 MILLILITER(S): at 03:26

## 2019-07-28 RX ADMIN — MORPHINE SULFATE 7.5 MILLIGRAM(S): 50 CAPSULE, EXTENDED RELEASE ORAL at 11:34

## 2019-07-28 RX ADMIN — Medication 3 MILLILITER(S): at 11:14

## 2019-07-28 RX ADMIN — Medication 1 APPLICATION(S): at 06:15

## 2019-07-28 RX ADMIN — POLYETHYLENE GLYCOL 3350 17 GRAM(S): 17 POWDER, FOR SOLUTION ORAL at 09:48

## 2019-07-28 NOTE — PROGRESS NOTE ADULT - PROBLEM SELECTOR PLAN 4
- right sided s/p coil placement in 2001  - left sided without intervention   - on Plavix 75 mg at home supposedly for coil  - continue Plavix

## 2019-07-28 NOTE — PROGRESS NOTE ADULT - SUBJECTIVE AND OBJECTIVE BOX
Internal Medicine Progress Note    =======================================================  CONTACT INFO  Pennie Sevilla M.D., PGY-3  Pager: [phone] ([facility]) / [phone] ([facility])    Mon-Fri: pager covered by day team 7am-7pm  After 7:00PM on weekdays and 12:00PM on weekends, page [phone]  =======================================================    Patient is a 63y old  Female who presents with a chief complaint of CC severe abdominal pain with nausea and vomiting (27 Jul 2019 16:24)      SUBJECTIVE / OVERNIGHT EVENTS: Given miralax. Had full body itching after morphine, given hydrocortisone cream and benadryl.    MEDICATIONS  (STANDING):  ALBUTerol/ipratropium for Nebulization 3 milliLiter(s) Nebulizer every 6 hours  amLODIPine   Tablet 2.5 milliGRAM(s) Oral every 24 hours  buDESOnide 160 MICROgram(s)/formoterol 4.5 MICROgram(s) Inhaler 2 Puff(s) Inhalation two times a day  calamine Lotion 1 Application(s) Topical three times a day  clopidogrel Tablet 75 milliGRAM(s) Oral daily  hydrocortisone 1% Cream 1 Application(s) Topical two times a day  pantoprazole    Tablet 40 milliGRAM(s) Oral before breakfast  polyethylene glycol 3350 17 Gram(s) Oral once    MEDICATIONS  (PRN):  diphenhydrAMINE 25 milliGRAM(s) Oral every 6 hours PRN Rash and/or Itching  morphine   Solution 7.5 milliGRAM(s) Oral every 3 hours PRN moderate to severe pain  ondansetron Injectable 4 milliGRAM(s) IV Push every 8 hours PRN Nausea and/or Vomiting    Vital Signs Last 24 Hrs  T(C): 36.6 (28 Jul 2019 05:02), Max: 36.9 (27 Jul 2019 21:28)  T(F): 97.8 (28 Jul 2019 05:02), Max: 98.4 (27 Jul 2019 21:28)  HR: 95 (28 Jul 2019 05:02) (80 - 99)  BP: 105/69 (28 Jul 2019 05:02) (105/69 - 127/78)  BP(mean): --  RR: 18 (28 Jul 2019 05:02) (18 - 18)  SpO2: 100% (28 Jul 2019 05:02) (99% - 100%)    CAPILLARY BLOOD GLUCOSE        I&O's Summary      PHYSICAL EXAM  GENERAL: NAD  HEAD:  Atraumatic  EYES: EOMI, PERRLA, conjunctiva and sclera clear  NECK: Supple, No JVD  CHEST/LUNG: Clear to auscultation bilaterally; No wheeze  HEART: Regular rate and rhythm; No murmurs, rubs, or gallops  ABDOMEN: Soft, Nontender, Nondistended; Bowel sounds present  EXTREMITIES:  2+ Peripheral Pulses, No clubbing, cyanosis, or edema  NEURO/PSYCH: AAOx3, nonfocal  SKIN: No rashes or lesions      LABS:                        10.8   5.93  )-----------( 291      ( 28 Jul 2019 06:50 )             33.9     Hemoglobin: 10.8 g/dL (07-28 @ 06:50)  Hemoglobin: 11.6 g/dL (07-27 @ 07:05)  Hemoglobin: 13.4 g/dL (07-26 @ 02:45)    CBC Full  -  ( 28 Jul 2019 06:50 )  WBC Count : 5.93 K/uL  RBC Count : 3.64 M/uL  Hemoglobin : 10.8 g/dL  Hematocrit : 33.9 %  Platelet Count - Automated : 291 K/uL  Mean Cell Volume : 93.1 fL  Mean Cell Hemoglobin : 29.7 pg  Mean Cell Hemoglobin Concentration : 31.9 %  Auto Neutrophil # : x  Auto Lymphocyte # : x  Auto Monocyte # : x  Auto Eosinophil # : x  Auto Basophil # : x  Auto Neutrophil % : x  Auto Lymphocyte % : x  Auto Monocyte % : x  Auto Eosinophil % : x  Auto Basophil % : x    07-28    139  |  100  |  11  ----------------------------<  86  3.8   |  28  |  1.02    Ca    9.2      28 Jul 2019 06:50  Phos  3.1     07-28  Mg     1.9     07-28    TPro  7.1  /  Alb  3.2<L>  /  TBili  0.6  /  DBili  x   /  AST  18  /  ALT  17  /  AlkPhos  145<H>  07-27    Creatinine Trend: 1.02<--, 0.84<--, 1.01<--  LIVER FUNCTIONS - ( 27 Jul 2019 07:05 )  Alb: 3.2 g/dL / Pro: 7.1 g/dL / ALK PHOS: 145 u/L / ALT: 17 u/L / AST: 18 u/L / GGT: 73 u/L                         EKG:   MICROBIOLOGY:    IMAGING:      Labs, imaging, EKG personally reviewed     CONSULTS: Internal Medicine Progress Note    =======================================================  CONTACT INFO  Pennie Sevilla M.D., PGY-3  Pager: [phone] ([facility]) / [phone] ([facility])    Mon-Fri: pager covered by day team 7am-7pm  After 7:00PM on weekdays and 12:00PM on weekends, page [phone]  =======================================================    Patient is a 63y old  Female who presents with a chief complaint of CC severe abdominal pain with nausea and vomiting (27 Jul 2019 16:24)      SUBJECTIVE / OVERNIGHT EVENTS: Given miralax. Had full body itching after morphine, given hydrocortisone cream and benadryl. Used morphine 15mg po and 7.5mg po x 3 past 24h.    MEDICATIONS  (STANDING):  ALBUTerol/ipratropium for Nebulization 3 milliLiter(s) Nebulizer every 6 hours  amLODIPine   Tablet 2.5 milliGRAM(s) Oral every 24 hours  buDESOnide 160 MICROgram(s)/formoterol 4.5 MICROgram(s) Inhaler 2 Puff(s) Inhalation two times a day  calamine Lotion 1 Application(s) Topical three times a day  clopidogrel Tablet 75 milliGRAM(s) Oral daily  hydrocortisone 1% Cream 1 Application(s) Topical two times a day  pantoprazole    Tablet 40 milliGRAM(s) Oral before breakfast  polyethylene glycol 3350 17 Gram(s) Oral once    MEDICATIONS  (PRN):  diphenhydrAMINE 25 milliGRAM(s) Oral every 6 hours PRN Rash and/or Itching  morphine   Solution 7.5 milliGRAM(s) Oral every 3 hours PRN moderate to severe pain  ondansetron Injectable 4 milliGRAM(s) IV Push every 8 hours PRN Nausea and/or Vomiting    Vital Signs Last 24 Hrs  T(C): 36.6 (28 Jul 2019 05:02), Max: 36.9 (27 Jul 2019 21:28)  T(F): 97.8 (28 Jul 2019 05:02), Max: 98.4 (27 Jul 2019 21:28)  HR: 95 (28 Jul 2019 05:02) (80 - 99)  BP: 105/69 (28 Jul 2019 05:02) (105/69 - 127/78)  BP(mean): --  RR: 18 (28 Jul 2019 05:02) (18 - 18)  SpO2: 100% (28 Jul 2019 05:02) (99% - 100%)    CAPILLARY BLOOD GLUCOSE        I&O's Summary      PHYSICAL EXAM  GENERAL: NAD  HEAD:  Atraumatic  EYES: EOMI, PERRLA, conjunctiva and sclera clear  NECK: Supple, No JVD  CHEST/LUNG: Clear to auscultation bilaterally; No wheeze  HEART: Regular rate and rhythm; No murmurs, rubs, or gallops  ABDOMEN: Soft, Nontender, Nondistended; Bowel sounds present  EXTREMITIES:  2+ Peripheral Pulses, No clubbing, cyanosis, or edema  NEURO/PSYCH: AAOx3, nonfocal  SKIN: No rashes or lesions      LABS:                        10.8   5.93  )-----------( 291      ( 28 Jul 2019 06:50 )             33.9     Hemoglobin: 10.8 g/dL (07-28 @ 06:50)  Hemoglobin: 11.6 g/dL (07-27 @ 07:05)  Hemoglobin: 13.4 g/dL (07-26 @ 02:45)    CBC Full  -  ( 28 Jul 2019 06:50 )  WBC Count : 5.93 K/uL  RBC Count : 3.64 M/uL  Hemoglobin : 10.8 g/dL  Hematocrit : 33.9 %  Platelet Count - Automated : 291 K/uL  Mean Cell Volume : 93.1 fL  Mean Cell Hemoglobin : 29.7 pg  Mean Cell Hemoglobin Concentration : 31.9 %  Auto Neutrophil # : x  Auto Lymphocyte # : x  Auto Monocyte # : x  Auto Eosinophil # : x  Auto Basophil # : x  Auto Neutrophil % : x  Auto Lymphocyte % : x  Auto Monocyte % : x  Auto Eosinophil % : x  Auto Basophil % : x    07-28    139  |  100  |  11  ----------------------------<  86  3.8   |  28  |  1.02    Ca    9.2      28 Jul 2019 06:50  Phos  3.1     07-28  Mg     1.9     07-28    TPro  7.1  /  Alb  3.2<L>  /  TBili  0.6  /  DBili  x   /  AST  18  /  ALT  17  /  AlkPhos  145<H>  07-27    Creatinine Trend: 1.02<--, 0.84<--, 1.01<--  LIVER FUNCTIONS - ( 27 Jul 2019 07:05 )  Alb: 3.2 g/dL / Pro: 7.1 g/dL / ALK PHOS: 145 u/L / ALT: 17 u/L / AST: 18 u/L / GGT: 73 u/L                         EKG:   MICROBIOLOGY:    IMAGING:      Labs, imaging, EKG personally reviewed     CONSULTS: Internal Medicine Progress Note    =======================================================  CONTACT INFO  Pennie Sevilla M.D., PGY-3  Pager: [phone] ([facility]) / [phone] ([facility])    Mon-Fri: pager covered by day team 7am-7pm  After 7:00PM on weekdays and 12:00PM on weekends, page [phone]  =======================================================    Patient is a 63y old  Female who presents with a chief complaint of CC severe abdominal pain with nausea and vomiting (27 Jul 2019 16:24)      SUBJECTIVE / OVERNIGHT EVENTS: Given miralax. Had full body itching after morphine, given hydrocortisone cream and benadryl. Used morphine 15mg po and 7.5mg po x 3 past 24h. Reports last BM last Thursday due to her IBS constipation. Feels better, epigastric pain improved, she feels ready to go home and followup with her outpt GI. Pt is tolerating diet. Pt is ambulating to bathroom. Denies N/V/D, F/C, CP, SOB, urinary symptoms.    MEDICATIONS  (STANDING):  ALBUTerol/ipratropium for Nebulization 3 milliLiter(s) Nebulizer every 6 hours  amLODIPine   Tablet 2.5 milliGRAM(s) Oral every 24 hours  buDESOnide 160 MICROgram(s)/formoterol 4.5 MICROgram(s) Inhaler 2 Puff(s) Inhalation two times a day  calamine Lotion 1 Application(s) Topical three times a day  clopidogrel Tablet 75 milliGRAM(s) Oral daily  hydrocortisone 1% Cream 1 Application(s) Topical two times a day  pantoprazole    Tablet 40 milliGRAM(s) Oral before breakfast  polyethylene glycol 3350 17 Gram(s) Oral once    MEDICATIONS  (PRN):  diphenhydrAMINE 25 milliGRAM(s) Oral every 6 hours PRN Rash and/or Itching  morphine   Solution 7.5 milliGRAM(s) Oral every 3 hours PRN moderate to severe pain  ondansetron Injectable 4 milliGRAM(s) IV Push every 8 hours PRN Nausea and/or Vomiting    Vital Signs Last 24 Hrs  T(C): 36.6 (28 Jul 2019 05:02), Max: 36.9 (27 Jul 2019 21:28)  T(F): 97.8 (28 Jul 2019 05:02), Max: 98.4 (27 Jul 2019 21:28)  HR: 95 (28 Jul 2019 05:02) (80 - 99)  BP: 105/69 (28 Jul 2019 05:02) (105/69 - 127/78)  BP(mean): --  RR: 18 (28 Jul 2019 05:02) (18 - 18)  SpO2: 100% (28 Jul 2019 05:02) (99% - 100%)    CAPILLARY BLOOD GLUCOSE        I&O's Summary      PHYSICAL EXAM  GENERAL: NAD  HEAD:  Atraumatic  EYES: PERRLA, conjunctiva and sclera clear  NECK: Supple, No JVD  CHEST/LUNG: Clear to auscultation bilaterally; No wheeze, on RA  HEART: Regular rate and rhythm; No murmurs, rubs, or gallops  ABDOMEN: Soft, mild epigastric tenderness, Nondistended; Bowel sounds present, no CVA tenderness  EXTREMITIES:  2+ Peripheral Pulses, No clubbing, cyanosis, or edema  NEURO/PSYCH: AAOx3, nonfocal  SKIN: No rashes or lesions      LABS:                        10.8   5.93  )-----------( 291      ( 28 Jul 2019 06:50 )             33.9     Hemoglobin: 10.8 g/dL (07-28 @ 06:50)  Hemoglobin: 11.6 g/dL (07-27 @ 07:05)  Hemoglobin: 13.4 g/dL (07-26 @ 02:45)    CBC Full  -  ( 28 Jul 2019 06:50 )  WBC Count : 5.93 K/uL  RBC Count : 3.64 M/uL  Hemoglobin : 10.8 g/dL  Hematocrit : 33.9 %  Platelet Count - Automated : 291 K/uL  Mean Cell Volume : 93.1 fL  Mean Cell Hemoglobin : 29.7 pg  Mean Cell Hemoglobin Concentration : 31.9 %  Auto Neutrophil # : x  Auto Lymphocyte # : x  Auto Monocyte # : x  Auto Eosinophil # : x  Auto Basophil # : x  Auto Neutrophil % : x  Auto Lymphocyte % : x  Auto Monocyte % : x  Auto Eosinophil % : x  Auto Basophil % : x    07-28    139  |  100  |  11  ----------------------------<  86  3.8   |  28  |  1.02    Ca    9.2      28 Jul 2019 06:50  Phos  3.1     07-28  Mg     1.9     07-28    TPro  7.1  /  Alb  3.2<L>  /  TBili  0.6  /  DBili  x   /  AST  18  /  ALT  17  /  AlkPhos  145<H>  07-27    Creatinine Trend: 1.02<--, 0.84<--, 1.01<--  LIVER FUNCTIONS - ( 27 Jul 2019 07:05 )  Alb: 3.2 g/dL / Pro: 7.1 g/dL / ALK PHOS: 145 u/L / ALT: 17 u/L / AST: 18 u/L / GGT: 73 u/L                         EKG:   MICROBIOLOGY:    IMAGING:      Labs, imaging, EKG personally reviewed     CONSULTS:

## 2019-07-28 NOTE — DISCHARGE NOTE NURSING/CASE MANAGEMENT/SOCIAL WORK - NSDCDPATPORTLINK_GEN_ALL_CORE
You can access the [facility] Patient Portal, offered by [facility], by registering with the following website: [email or web address]

## 2019-07-28 NOTE — DISCHARGE NOTE NURSING/CASE MANAGEMENT/SOCIAL WORK - NSDCPEWEB_GEN_ALL_CORE
[facility] for Tobacco Control website --- [email or web address] website --- [email or web address]

## 2019-07-28 NOTE — PROGRESS NOTE ADULT - ATTENDING COMMENTS
Patient was seen and examined personally by me. I have discussed the plan and reviewed the resident's note and agree with the above physical exam findings including assessment and plan except as indicated below.    Advance diet as tolerated  morphine PRN for pain control  F/u IGG4 Ab  Hold off Edarbyclor for BP control. Will do norvasc 2.5 mg instead. BP controlled especially in setting of known cerebral aneursyms  Dc home if tolerating regular diet. DC time: 33 min
Patient was seen and examined personally by me. I have discussed the plan and reviewed the resident's note and agree with the above physical exam findings including assessment and plan except as indicated below.    Tolerating diet, pain controlled  F/u IGG4 Ab  Hold off Edarbyclor for BP control. C/w norvasc 2.5 mg instead. BP controlled especially in setting of known cerebral aneursyms  Dc home. DC time: 33 min

## 2019-07-28 NOTE — PROGRESS NOTE ADULT - ASSESSMENT
62 yo female with a PMHx of HTN, COPD/Asthma, MACK, GERD, bl brain aneurisms (s/p coiling of the right) , newly diagnosed Von Myenberg syndrome in May 2019, and IBS presenting for 4 days of epigastric pain that radiates to the RUQ found to have elevated lipase >600 and a nl RUQ scan suggestive of acute pancreatitis 62yo female with a PMHx of HTN, COPD/Asthma, MACK, GERD, bl brain aneurisms (s/p coiling of the right) , newly diagnosed Von Myenberg syndrome in May 2019, and IBS presenting for 4 days of epigastric pain that radiates to the RUQ found to have elevated lipase >600 and a nl RUQ scan suggestive of acute pancreatitis

## 2019-07-28 NOTE — DISCHARGE NOTE NURSING/CASE MANAGEMENT/SOCIAL WORK - NSDCPEEMAIL_GEN_ALL_CORE
[facility] for Tobacco Control email [email or web address]

## 2019-07-28 NOTE — PROGRESS NOTE ADULT - PROBLEM SELECTOR PLAN 2
- on Edarbyclor 40 mg at home  - will hold since sartan component may cause pancreatitis   - continue 2.5 mg amlodipine q24   - IV hydration on hold due to infiltrated IV   - tolerating PO intake, encourage hydration - on Edarbyclor 40 mg at home  - will hold since sartan component may cause pancreatitis   - continue 2.5 mg amlodipine q24   - tolerating PO intake, encourage hydration

## 2019-07-28 NOTE — PROGRESS NOTE ADULT - PROBLEM SELECTOR PLAN 3
- patient on Trelegy ellipta at home  - prior on Symbicort but was not affective   - no wheezing on exam today  - continue duonebs q6 PRN wheezing  - continue Symbicort 160 for COPD, Trelegy equivalent not available as per pharmacy - patient on Trelegy ellipta at home  - prior on Symbicort but was not affective   - continue duonebs q6 PRN wheezing  - continue Symbicort 160 for COPD, Trelegy equivalent not available as per pharmacy  -lung exam clear

## 2019-07-28 NOTE — PROGRESS NOTE ADULT - PROBLEM SELECTOR PLAN 7
DVT: mobility impaired due to pain, will start heparin 5000 Units subq  Diet: will start on clears; advance as tolerated   Dispo: pending PT eval, most likely home DVT: HSQ  Diet: regular diet  Dispo: d/c home today

## 2019-07-28 NOTE — PROGRESS NOTE ADULT - PROBLEM SELECTOR PLAN 1
- Patient with worsening epigastric pain radiating to RUQ found to have lipase >600 suggestive of acute pancreatitis; etiology currently unknown  - ddx include hypercalcemia, hypertriglyceridemia, alcohol abuse, autoimmune pancreatitis, cholelithiasis and abdominal trauma   - denies abdominal trauma, hx of abdominal trauma, Ca and TG wnl, no hx of alcohol abuse with last drink in May, no hx of autoimmune dz in patient or family, no sign of cholelithiasis on RUQ US, no recent ERCP  - consider drug induced: no opioid hx but is on Edarbyclor for HTN which has a Sartan component, as per uptodate, sartans are class I drugs that can cause pancreatitis  - hold home antihypertensive  - patient tolerating clears but not solids; will transition back to clears   - switch to PO morphine 15mg q4 for pain   - zofran 4mg IV q8 PRN nausea   - Alk phos elevated, GGT elevated to 74; consider contribution of Von Myenberg syndrome   - r/o autoimmune pancreatitis; f/u IGG4 Ab - Patient with worsening epigastric pain radiating to RUQ found to have lipase >600 suggestive of acute pancreatitis; etiology currently unknown  - ddx include hypercalcemia, hypertriglyceridemia, alcohol abuse, autoimmune pancreatitis, cholelithiasis and abdominal trauma   - denies abdominal trauma, hx of abdominal trauma, Ca and TG wnl, no hx of alcohol abuse with last drink in May, no hx of autoimmune dz in patient or family, no sign of cholelithiasis on RUQ US, no recent ERCP  - consider drug induced: no opioid hx but is on Edarbyclor for HTN which has a Sartan component, as per uptodate, sartans are class I drugs that can cause pancreatitis  - hold home antihypertensive  - patient tolerating clears but not solids; will transition back to clears   - switch to PO morphine 15mg q4 for pain   - zofran 4mg IV q8 PRN nausea   - Alk phos elevated, GGT elevated to 74; consider contribution of Von Myenberg syndrome   - r/o autoimmune pancreatitis; f/u IGG4 Ab  -tolerating diet

## 2019-07-28 NOTE — PROGRESS NOTE ADULT - PROBLEM SELECTOR PROBLEM 1
Acute pancreatitis, unspecified complication status, unspecified pancreatitis type
Acute pancreatitis, unspecified complication status, unspecified pancreatitis type

## 2019-07-28 NOTE — PROGRESS NOTE ADULT - PROBLEM SELECTOR PROBLEM 3
Asthma with Chronic Obstructive Pulmonary Disease (COPD)
Asthma with Chronic Obstructive Pulmonary Disease (COPD)

## 2019-08-08 ENCOUNTER — TRANSCRIPTION ENCOUNTER (OUTPATIENT)
Age: 64
End: 2019-08-08

## 2021-12-03 NOTE — ED PROVIDER NOTE - SKIN NEGATIVE STATEMENT, MLM
[Other: _____] : [unfilled] [de-identified] : AWV\par \par has HHA - now gets 4 hrs only requesting more hrs \par \par Memory problem- forgetting things were she kept - getting worse as per daughter - at times anxiety sp when she is alone - looking for increase in hours - has arthritis in hands and lower back with fatigue walking doing work in home \par \par Insomnia - unable to sleep well at night , takes longer time for her to sleep , watches TV before going to bed , no naps in day , sedentary sitting at home all day.\par - insurance will not be covering tamazepam in  \par \par Diabetic- monitors Accu-Cheks readings- 129-130 mainly, no values in 200, saw Ophth 2021 stable + retinopathy , and glaucoma , did b/l cataract Sx , denies any hypoglycemic episode. has not doen lazer rx for 2 ysr - she has to f/u on it pending \par refused flu vaccine and Pneumovax \par - taking metformin 1000 er qd\par saw ophtho 21\par \par Hyperlipidemia- taking cholesterol medication , no muscle pain. \par \par Hypertension- no CP , sob, palpitation or dizzy spells, compliant with medication , saw cardio Had stress and echo that were normal. No other complaints. Began taking asa 81 mg daily. \par \par GERD- acid reflux with certain foods occ. after eating , was see n in  for chest discomfort - her omeprazole was d/christopher and started on pepcid - she is doing well with it , no cp , son palpitations \par \par Obesity-lost 4 lbs since last visit , eating and not exercising \par \par Depression since 2016 spouse death \par - stable now , NO SI/HI \par - on Remeron 7.5 seem to work needs refill - no side effects - getting anxiety epsiodes while alone in home \par -- finding problems with every little things , depressed -low mood - sad feeling - not caring for self - lives alone at home since her   3 yrs ago -her daughter works full time - she does not want to go live with her \par - melatonin does not help \par  no abrasions, no jaundice, no lesions, no pruritis, and no rashes.

## 2023-01-11 NOTE — H&P ADULT - NSHPPOASURGSITEINCISION_GEN_ALL_CORE
[facility]  [address] [place] [id number]  Phone: 5 [facility], APRN - CNP          January 12, 2023     Patient: Mahnaz Ortiz   YOB: 1992   Date of Visit: 1/11/2023     Immunization History   Administered Date(s) Administered    COVID-19, PFIZER PURPLE top, DILUTE for use, (age 15 y+), 30mcg/0.3mL 04/10/2021, 05/01/2021, 12/29/2021    DTP 01/12/1995, 02/13/1995, 04/14/1995, 03/31/1999    DTaP vaccine 07/26/1999    DTaP, 5 Pertussis Antigens (Daptacel) 07/26/1999    HPV 9-valent Marcelyn Clore) 10/16/2018, 12/13/2018, 09/08/2020    Hep B/Hib (Comvax) 07/26/1999    Hepatitis A Ped/Adol (Havrix, Vaqta) 08/21/2009, 12/28/2011    Hepatitis A Vaccine 08/21/2009, 12/28/2011    Hepatitis B Ped/Adol (Engerix-B, Recombivax HB) 11/14/1998, 03/31/1999    Hepatitis B vaccine 11/14/1998, 03/31/1999    Influenza A ([id number]) Vaccine Nasal 11/05/2009    Influenza, AFLURIA (age 1 yrs+), FLUZONE, (age 10 mo+), MDV, 0.5mL 12/28/2011, 10/16/2018    Influenza, FLUARIX, FLULAVAL, Fer Pascual (age 10 mo+) AND AFLURIA, (age 1 y+), PF, 0.5mL 12/28/2011, 10/16/2018    Influenza, Trivalent, Recombinant, Injectable vaccine, PF 12/28/2011    MMR 08/16/1995, 03/31/1999, 07/26/1999, 04/03/2012    MMRV (ProQuad) 07/26/1999, 04/21/2008    Meningococcal ACWY Vaccine 04/21/2008    Meningococcal MCV4P (Menactra) 04/21/2008    Polio OPV 01/12/1995, 02/13/1995, 04/14/1995, 03/13/1999    Polio, Bivalent Oral Vaccine (Types 1 And 3)(Non-US) 01/12/1995, 02/13/1995, 04/14/1995, 03/13/1999    Tdap (Boostrix, Adacel) 10/13/2006, 12/13/2018    Unknown Immunization 11/05/2009    Varicella (Varivax) 07/26/1999, 04/21/2008     Sincerely,         Vani Aguilar, JAYLON - CNP no

## 2023-03-27 NOTE — ED PROVIDER NOTE - NS_EDPROVIDERDISPOUSERTYPE_ED_A_ED
Decrease symptoms/Improve social/vocational/coping skills/Psychoeducation
Attending Attestation (For Attendings USE Only)...